# Patient Record
Sex: MALE | Race: OTHER | URBAN - METROPOLITAN AREA
[De-identification: names, ages, dates, MRNs, and addresses within clinical notes are randomized per-mention and may not be internally consistent; named-entity substitution may affect disease eponyms.]

---

## 2020-03-06 ENCOUNTER — TELEPHONE (OUTPATIENT)
Dept: PULMONOLOGY | Facility: CLINIC | Age: 60
End: 2020-03-06

## 2020-03-06 NOTE — TELEPHONE ENCOUNTER
----- Message from Annalisa Dale sent at 3/6/2020  3:21 PM CST -----  Pt is requesting a call back to speak with Nurse or Doctor about getting a appointment from a hospital follow up    Please call and advise            Thank you

## 2020-03-06 NOTE — TELEPHONE ENCOUNTER
A voicemail was left for patient at 3:52pm, patient was informed that I was returning his call from Dr. James office from [facility] located at [facility] with the Pulmonary Department. Patient also inform that he can give us a call back at our office once he receives this message.

## 2020-03-12 ENCOUNTER — TELEPHONE (OUTPATIENT)
Dept: PULMONOLOGY | Facility: CLINIC | Age: 60
End: 2020-03-12

## 2020-03-12 NOTE — TELEPHONE ENCOUNTER
----- Message from Helen Trujillo MA sent at 3/12/2020  2:19 PM CDT -----  Contact: self/[phone]  PT is returning a missed call to Chela in reference to an appt. Requesting call back.

## 2020-03-12 NOTE — TELEPHONE ENCOUNTER
Patient was informed that I was returning a call from Dr. James office on 03/06 due to a message being left requesting a call to be scheduled for an hospital follow up. Patient stated that a referral from Dr. Kayla Burnham MD was faxed over to our office, also states that he has multiple blood clots in his lungs, legs and stomach & that he is on 200 mg Eliquis. I have provided Mr. Epstein our office fax number ([phone]) patient was advised to contact Dr. Kayla Burnham and asked that the referral be faxed over to our office. Patient verbalized that he understand.